# Patient Record
(demographics unavailable — no encounter records)

---

## 2024-10-25 NOTE — DISCUSSION/SUMMARY
[FreeTextEntry1] :  handout given Beyfortus and discussed, patient observed for 15 minutes in office no reaction observed  The components of the vaccine(s) to be administered today are listed in the plan of care. The disease(s) for which the vaccine(s) are intended to prevent and the risks have been discussed with the caretaker. The risks are also included in the appropriate vaccination information statements which have been provided to the patient's caregiver. The caregiver has given consent to vaccinate. emollient to skin preferred  including Cetaphil not baby, cerave, Vaseline without fragrance  Medication hydrocortisone otc 1% bid ointment given if needed do not apply to eye use sparingly bid for 10 to 14 days prune juice start 15 ml to formula start once a day can increase to bid if needed encouraged mom to reschedule neonatology follow up and mom to call to check if can be seen closer to home iron increased for weight iron chang in sol  (15 Fe) 0.5 ml once a day

## 2024-10-25 NOTE — HISTORY OF PRESENT ILLNESS
[Nirsevimab] : Nirsevimab [FreeTextEntry1] : BEYFORTUS [de-identified] : PT HERE FOR BEYFORTUS VACCINE  ANd RASH LOOKED AT DURING VISIT. PT OBSERVED FOR 15 MIN, INJECTION SITE  NO REDNESS OR IRRITATION. no reaction [FreeTextEntry6] : LG is here today for follow up beyfortus vaccine -rsv monoclonal antibody concerns re constipation and skin dry patches 30  week 67 ex premie dry skin face arms trunk face arms trunk uses several creams rotes including Cetaphil, Al and Al constipated every 2 days hard iron inc missed   appt due re travel distance

## 2024-10-25 NOTE — HISTORY OF PRESENT ILLNESS
[Nirsevimab] : Nirsevimab [FreeTextEntry1] : BEYFORTUS [de-identified] : PT HERE FOR BEYFORTUS VACCINE  ANd RASH LOOKED AT DURING VISIT. PT OBSERVED FOR 15 MIN, INJECTION SITE  NO REDNESS OR IRRITATION. no reaction [FreeTextEntry6] : LG is here today for follow up beyfortus vaccine -rsv monoclonal antibody concerns re constipation and skin dry patches 30  week 67 ex premie dry skin face arms trunk face arms trunk uses several creams rotes including Cetaphil, Al and Al constipated every 2 days hard iron inc missed   appt due re travel distance

## 2024-10-25 NOTE — HISTORY OF PRESENT ILLNESS
[Nirsevimab] : Nirsevimab [FreeTextEntry1] : BEYFORTUS [de-identified] : PT HERE FOR BEYFORTUS VACCINE  ANd RASH LOOKED AT DURING VISIT. PT OBSERVED FOR 15 MIN, INJECTION SITE  NO REDNESS OR IRRITATION. no reaction [FreeTextEntry6] : LG is here today for follow up beyfortus vaccine -rsv monoclonal antibody concerns re constipation and skin dry patches 30  week 67 ex premie dry skin face arms trunk face arms trunk uses several creams rotes including Cetaphil, Al and Al constipated every 2 days hard iron inc missed   appt due re travel distance

## 2024-10-25 NOTE — PHYSICAL EXAM
[TextEntry] : Gen: Awake, alert,  In no acute distress , well appearing Eyes: no periorbital swelling  dry patches on face trunk Ears :   right Tympanic Membrane:  Normal               left tympanic Membrane:  Normal Pharynx: no redness ,no sores, not inflamed  Neck supple Cardiac : normal rate, regular rhythm, S1,S2 normal, no murmur Lungs: clear to auscultation

## 2024-10-25 NOTE — HISTORY OF PRESENT ILLNESS
[Nirsevimab] : Nirsevimab [FreeTextEntry1] : BEYFORTUS [de-identified] : PT HERE FOR BEYFORTUS VACCINE  ANd RASH LOOKED AT DURING VISIT. PT OBSERVED FOR 15 MIN, INJECTION SITE  NO REDNESS OR IRRITATION. no reaction [FreeTextEntry6] : LG is here today for follow up beyfortus vaccine -rsv monoclonal antibody concerns re constipation and skin dry patches 30  week 67 ex premie dry skin face arms trunk face arms trunk uses several creams rotes including Cetaphil, Al and Al constipated every 2 days hard iron inc missed   appt due re travel distance

## 2024-11-11 NOTE — PHYSICAL EXAM
[Alert] : alert [Flat Open Anterior [place]] : flat open anterior fontanelle [Red Reflex] : red reflex bilateral [PERRL] : PERRL [Normally Placed Ears] : normally placed ears [Auricles Well Formed] : auricles well formed [Clear Tympanic membranes] : clear tympanic membranes [Light reflex present] : light reflex present [Bony landmarks visible] : bony landmarks visible [Nares Patent] : nares patent [Palate Intact] : palate intact [Uvula Midline] : uvula midline [Symmetric Chest Rise] : symmetric chest rise [Clear to Auscultation Bilaterally] : clear to auscultation bilaterally [Regular Rate and Rhythm] : regular rate and rhythm [S1, S2 present] : S1, S2 present [+2 Femoral Pulses] : (+) 2 femoral pulses [Soft] : soft [Bowel Sounds] : bowel sounds present [Central Urethral Opening] : central urethral opening [Testicles Descended] : testicles descended bilaterally [Patent] : patent [Normally Placed] : normally placed [No Abnormal Lymph Nodes Palpated] : no abnormal lymph nodes palpated [Startle Reflex] : startle reflex present [Plantar Grasp] : plantar grasp reflex present [Symmetric Hamzah] : symmetric hamzah [Acute Distress] : no acute distress [Discharge] : no discharge [Palpable Masses] : no palpable masses [Murmurs] : no murmurs [Tender] : nontender [Distended] : nondistended [Hepatomegaly] : no hepatomegaly [Splenomegaly] : no splenomegaly [Hickman-Ortolani] : negative Hickman-Ortolani [Allis Sign] : negative Allis sign [Spinal Dimple] : no spinal dimple [Tuft of Hair] : no tuft of hair [Rash or Lesions] : no rash/lesions [FreeTextEntry2] : right occipital flattening, no frontal bossing [de-identified] : + hydroceles b/l [de-identified] : dry skin

## 2024-11-11 NOTE — DISCUSSION/SUMMARY
[] : The components of the vaccine(s) to be administered today are listed in the plan of care. The disease(s) for which the vaccine(s) are intended to prevent and the risks have been discussed with the caretaker.  The risks are also included in the appropriate vaccination information statements which have been provided to the patient's caregiver.  The caregiver has given consent to vaccinate. [FreeTextEntry1] : Recommend breastfeeding, 8-12 feedings per day. Mother should continue prenatal vitamins and avoid alcohol. If formula is needed, recommend iron-fortified formulations, 2-4 oz every 3-4 hrs. Cereal may be introduced using a spoon and bowl. When in car, patient should be in rear-facing car seat in back seat. Put baby to sleep on back, in own crib with no loose or soft bedding. Lower crib matress. Help baby to maintain sleep and feeding routines. May offer pacifier if needed. Continue tummy time when awake. dry skin- reviewed advise lotions/soaps and detergents without scent or dye; apply Aquaphor or Eucerin head to toe after bath time; monitor and call if further concern for recheck. Advise f/u with specialist as planned, parent reschedule NICU f/u visit- continue current feeding plan.  positional plagiocephaly- increase tummy time, f/u with early intervention hydrocele- continue to monitor, reviewed hernia precautions with parent.   none

## 2024-11-11 NOTE — HISTORY OF PRESENT ILLNESS
[Mother] : mother [Well-balanced] : well-balanced [Formula ___ oz/feed] : [unfilled] oz of formula per feed [Normal] : Normal [In Bassinet/Crib] : sleeps in bassinet/crib [On back] : sleeps on back [Tummy time] : tummy time [No] : No cigarette smoke exposure [Water heater temperature set at <120 degrees F] : Water heater temperature set at <120 degrees F [Rear facing car seat in back seat] : Rear facing car seat in back seat [Carbon Monoxide Detectors] : Carbon monoxide detectors at home [Smoke Detectors] : Smoke detectors at home. [Co-sleeping] : no co-sleeping [FreeTextEntry7] : 4 Month WCC [de-identified] : similac neosure 3oz Q3hrs [FreeTextEntry1] : NICU premature infant 30.6 week gestation; missed NICU f/u- has not rescheduled yet, has developmental visit schedule 4/2025; no EI at this time grade 1 IVH on HUS- resolving on 8/2024 HUS- to recheck 2/2025 ROP- to f/u 2/2025 med: poly vi sol and ferrinsol

## 2025-01-04 NOTE — REASON FOR VISIT
[F/U - Hospitalization] : follow-up of a recent hospitalization for [Developmental Delay] : developmental delay

## 2025-01-06 NOTE — BIRTH HISTORY
[Birthweight ___ kg] : weight [unfilled] kg [Weight ___ kg] : weight [unfilled] kg [EHM: ___] : EHM: [unfilled] [de-identified] : Vaginal delivery of a 32 years old  mom at 30 + 6 weeks of gestation. Her serologies are negative for HIV/Hep B/RPR. Rubella and rubeola immune. A+ , GBS unknown. Her pregnancy is complicated by  labor. 2 doses of beta given. Mom also has asthma on albuterol, on procardia for gestational hypertension. Received 8 doses of amp and 3 dose of flagyl, magnesium given. ROM - 0.5 hours - clear . Baby vigorous at birth, DCC for 60 seconds, Nuchal cord around X 1 Apgar 7 and 8 at 1 and 5 minutes respectively  [de-identified] :  30 + 6/7 weeks, baby boy born via , S/P: Hyperbilirubinemia requiring Photo Rx, P Sepsis, RDS, hypoglycemia, thermoreg, and AOP

## 2025-01-06 NOTE — HISTORY OF PRESENT ILLNESS
[Gestational Age: ___] : Gestational Age: [unfilled] [Chronological Age: ___] : Chronological Age: [unfilled] [Corrected Age: ___] : Corrected Age: [unfilled] [Date of D/C: ___] : Date of D/C: [unfilled] [Developmental Pediatrics: ___] : Developmental Pediatrics: [unfilled] [Ophthalmology: ___] : Ophthalmology: [unfilled] [de-identified] : D/C JANET [de-identified] :  High Risk & Developmental follow up NRE 7 [de-identified] : n/a

## 2025-01-06 NOTE — HISTORY OF PRESENT ILLNESS
[Gestational Age: ___] : Gestational Age: [unfilled] [Chronological Age: ___] : Chronological Age: [unfilled] [Corrected Age: ___] : Corrected Age: [unfilled] [Date of D/C: ___] : Date of D/C: [unfilled] [Developmental Pediatrics: ___] : Developmental Pediatrics: [unfilled] [Ophthalmology: ___] : Ophthalmology: [unfilled] [de-identified] : D/C JANET [de-identified] :  High Risk & Developmental follow up NRE 7 [de-identified] : n/a

## 2025-01-06 NOTE — ASSESSMENT
[FreeTextEntry1] : LG MALLORY  is a ____week gestation infant, now chronologic age ______ , corrected age ______ seen in  follow-up. Pertinent NICU history includes ____.  The following issues were addressed at this visit.  Growth and nutrition: Weight gain has been  ___ oz/  ____  days and plots at the ____ percentile for corrected age.  Head growth and length are at the ___ and ___ percentiles respectively.  Baby is currently feeding _______  and the plan is to continue ______  until ______ because of _______ . Due to prematurity, solid foods are not recommended until 5-6 months corrected age with good head control. Labs to be obtained today _________ . Continue vitamin supplements.  Development/neuro: baby has developmental delay for chronologic age, was seen by PT/OT today and given home exercises to do. Baby also has  ( list other  neuro abnormalities here). Early Intervention is recommended/is not needed at this time.  Baby will follow-up with pediatric developmental in ______ .   Anemia: Baby has been on iron supplements and will continue/discontinue/increase to ____ . Hct reviewed and is appropriate for age OR  Will check hct/retic today.   ROP: Baby is at risk for ROP and other ophthalmologic complications due to prematurity and will follow with ophthalmology _______  . Parents informed of importance of ophtho follow-up.    Other:   Health maintenance: Reviewed routine vaccination schedule with parent as well as guidance for flu vaccine for family, COVID-19 precautions, and need for PMD f/u.  Also discussed bathing and skin care recommendations.   Reviewed notes by (other services)   Next neonatology f/u: _________ .

## 2025-01-06 NOTE — BIRTH HISTORY
[Birthweight ___ kg] : weight [unfilled] kg [Weight ___ kg] : weight [unfilled] kg [EHM: ___] : EHM: [unfilled] [de-identified] : Vaginal delivery of a 32 years old  mom at 30 + 6 weeks of gestation. Her serologies are negative for HIV/Hep B/RPR. Rubella and rubeola immune. A+ , GBS unknown. Her pregnancy is complicated by  labor. 2 doses of beta given. Mom also has asthma on albuterol, on procardia for gestational hypertension. Received 8 doses of amp and 3 dose of flagyl, magnesium given. ROM - 0.5 hours - clear . Baby vigorous at birth, DCC for 60 seconds, Nuchal cord around X 1 Apgar 7 and 8 at 1 and 5 minutes respectively  [de-identified] :  30 + 6/7 weeks, baby boy born via , S/P: Hyperbilirubinemia requiring Photo Rx, P Sepsis, RDS, hypoglycemia, thermoreg, and AOP

## 2025-01-10 NOTE — HISTORY OF PRESENT ILLNESS
[FreeTextEntry6] :  + congestion and cough X1day, no ST, no ear pain, no n/v/c/d, eating less and drinking well, normal voiding. afebrile. no COVID or flu exposure [de-identified] : Mom states pt is congested and coughing since yesterday. Mom mentioned pt had a fever of 98 pt felt warm

## 2025-01-10 NOTE — DISCUSSION/SUMMARY
[FreeTextEntry1] :  D/W caregiver viral URI- recommend supportive care including antipyretics, fluids, and nasal saline followed by nasal suction. Return if symptoms worsen or persist.  COVID-19 and flu rapid negative today-. Answered patient questions about COVID-19 including signs and symptoms, self home care and proper isolation precautions. time spent: 25min

## 2025-04-19 NOTE — HISTORY OF PRESENT ILLNESS
[FreeTextEntry7] : 9 mo C [NO] : No [FreeTextEntry1] : LG  is here for 9 month  well child visit history 30 6/7 premie 7 months corrected Nutrition:Neosure 28 oz per day puree once a day spits out Elimination: Normal urination and bowel movements Sleep: No concerns Immunizations: Up to date. Environmental   safety discussed  Patient is doing well at home. Parent(s) have current concerns or issues. doing well , pulls to stand crawls cruising babbling NICU high risk missed 9/12 and 1/9 cancelled re winter mom unaware re has upcoming  developmental peds  Dr. Lobo 4/24/25 10 am mom unaware given information including phone number and address not on iron, poly vis ol 1. optho needs follow up difficult travel one hour away will send to optho 2. NICU not seen upcoming developmental peds appt   3.Neuro: Exam  HUS at one week of age showed b/l grade 1 IVH

## 2025-04-19 NOTE — PHYSICAL EXAM
[TextEntry] : General Appearance:  Awake,  Alert  In no acute distress very active gets to standing position on mom's lap Head:  Appearance:  Anterior fontanelle open and flat Neck:  supple. Eyes:   Pupils:  PERRL bilateral red frelex Ears: bilateral  Tympanic Membrane:  Pearly with light reflex bilaterally. Pharynx:Normal findings  non erythematous pharynx Lungs:  Clear to auscultation. Cardiovascular: Heart Rate And Rhythm:  Regular. Heart Sounds:  Normal. Murmurs:  No murmurs were heard. Abdomen: Palpation:  Soft.  Liver:  Not enlarged. Spleen:  Not enlarged.  Genitalia: Geovani 1 testes descended bilateral , no hernia  Musculoskeletal System: General/bilateral:  Normal movement of all extremities.   Normal spine and back.  Normal spine and back. Hips: General/bilateral:  An Ortolani test of the hips was negative.   Hickman's test of the hips was negative Neurological:Motor:  Normal muscle tone. congenital dermal melanocytosis

## 2025-04-19 NOTE — PLAN
[TextEntry] : solids discussed slow weight gain 19 to 20 to 13% discussed optho refer closer to home and importance to see developmental pediatrician upcoming appt next week declines weight check 6 weeks increase solids discussed including table food, can try avocado, whole milk yogurt, eggs discussed increasing meals to 2 times per day then 3 times per day mom to check using baby water if water contains fluoride then obtain otc vitamins and do not use mvf

## 2025-04-24 NOTE — FAMILY HISTORY
[TextEntry] : LG is the  1st born child of a non-consanguineous couple. Family history was unremarkable for any individuals presenting with: autism, seizures, musculoskeletal conditions, bleeding conditions,  hearing loss,  bipolar, anxiety, depression, substance abuse  Family history is significant for:  developmental delay - Mother received speech therapy  Learning difficulties in Mother - had resource room

## 2025-04-24 NOTE — SOCIAL HISTORY
[TextEntry] : Eli lives with his mother, who is co-parenting with Eli's father who is involved in his life but does not live with them.  Eli is taken care of by his maternal aunt when his mother is at work.   Mother: completed associate degree.   Works for [employer] Father: completed HS.  Owns a family business.    Eli's mother is currently receiving benefits from the WIC program. Neosure 8 cans per month, 32 purees, 16 ounce 2 boxes of cereals

## 2025-04-24 NOTE — HISTORY OF PRESENT ILLNESS
[TextEntry] : CC: LG  is here for an assessment of developmental milestones; patient is at risk for developmental delay secondary to  Patient is seen for developmental/behavioral progress. History obtained from  Mother.  Due to age, patient is unable to provide comprehensive history, requiring an independent historian. Records reviewed: PCP notes     Gestational Age:    30: 6weeks Chronological Age: 9.6  months Corrected Age: 7.5 months  Caregiver concerns:  none    Diet:  Formula: Neosure 4 ounces Q 3 hours Solids: baby foods  No chewing or texture difficulties   Sleep: - on back - in the crib; no toys/pillows in the crib - sleeps 5 hours per night  - sleep associations: rocked to fall asleep    Elimination habits: - BMs every other day; occasionally has constipation, which is relieved with prune juice   early intervention (EI): none Intercurrent Illnesses/ Hosp/ ER Visits:    Subspecialty Visits: Ophthalmology: had an initial assessment as per NICU recommendation; needs to schedule a 6 month f/u  Primary care pediatrician: [facility]   Birth History: Vaginal delivery of a 32 years old  mom at 30 + 6 weeks of gestation. Her serologies are negative for HIV/Hep B/RPR. Rubella and rubeola immune. A+ , GBS unknown. Her pregnancy is complicated by  labor. 2 doses of beta given. Mom also has asthma on albuterol, on procardia for gestational hypertension. Received 8 doses of amp and 3 dose of flagyl, magnesium given. ROM - 0.5 hours - clear . Baby vigorous at birth, DCC for 60 seconds, Nuchal cord around X 1, apgar 7 and 8 at 1 and 5 minutes respectively. Started on CPAP after bring to warmer on +5 21% for increased work of breathing. transferred to nicu on cpap in [facility], temp was 98 F Social History: No history of alcohol/tobacco exposure obtained NRE=7

## 2025-04-24 NOTE — PLAN
[FreeTextEntry1] : Based on the examination and evaluation done today, Eli's development is well on course for his corrected age. At risk for developmental delay  #  At risk for impaired child development (V49.89) (Z91.89)    Family was provided with a copy of neurodevelopmental positions  3 and 4, and handout   handout 'Children learn through play' from [facility] LG  was provided with a book from reach out and read program Language promoting skills were discussed, such as listening to classical and nursery rhymes, limiting exposure to screens and electronic devices Anticipatory guidance provided for common safety concerns (i.e. 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    Follow-up with specialists as scheduled - will reach out to scheduling team to help with scheduling ophthalmology follow up.    All questions answered Follow up in 6- 8 months Phone follow up as needed

## 2025-04-24 NOTE — PHYSICAL EXAM
[Roll Prone to Supine] : roll prone to supine [Roll Supine to Prone] : rolls supine to prone [Sits With Arm Support] : sits with arm support [Crawl] : crawls  [Come to Sit] : comes to sit [Pull to Stand] : pulls to stand [Unfisted] : unfisted [Manipulates Fingers] : manipulates fingers [Transfer] : transfers objects [Unilateral Reach/Grasp] : unilaterally reaches/grasps  [Soothes When Picked Up] : soothes when picked up  [Social Smile] : has a social smile [Orients To Voice] : orients to voice [Laughs Aloud] : laughs aloud ["Kiki Crane"] : kiki cummins [Razzing] : razzing [Babbling] : babbling [Normal] : attention level, irritability, interaction and responsivity appropriate for age [Head Lag] : head lag [Truncal Suspect] : increased suspect truncal tone noted [Downward [place]] : normal downward parachute [Anterior Protective] : normal anterior protective [Lateral Protective] : normal lateral protective [Knee] : normal knee tendon  [Clonus] : normal clonus tendon [de-identified] : + intermittenly tenses his arms and legs b/l, but able to relax